# Patient Record
Sex: MALE | Race: WHITE | Employment: UNEMPLOYED | URBAN - METROPOLITAN AREA
[De-identification: names, ages, dates, MRNs, and addresses within clinical notes are randomized per-mention and may not be internally consistent; named-entity substitution may affect disease eponyms.]

---
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
## 2020-05-04 RX ORDER — ACETAZOLAMIDE 500 MG/1
CAPSULE, EXTENDED RELEASE ORAL
Qty: 60 CAP | Refills: 1 | Status: SHIPPED | OUTPATIENT
Start: 2020-05-04 | End: 2020-08-04

## 2020-07-24 ENCOUNTER — TELEPHONE (OUTPATIENT)
Dept: NEUROLOGY | Age: 20
End: 2020-07-24

## 2020-08-04 RX ORDER — ACETAZOLAMIDE 500 MG/1
CAPSULE, EXTENDED RELEASE ORAL
Qty: 60 CAP | Refills: 1 | Status: SHIPPED | OUTPATIENT
Start: 2020-08-04 | End: 2020-09-26

## 2020-08-11 ENCOUNTER — OFFICE VISIT (OUTPATIENT)
Dept: NEUROLOGY | Age: 20
End: 2020-08-11
Payer: COMMERCIAL

## 2020-08-11 VITALS
TEMPERATURE: 97.7 F | HEIGHT: 71 IN | BODY MASS INDEX: 44.1 KG/M2 | DIASTOLIC BLOOD PRESSURE: 74 MMHG | HEART RATE: 120 BPM | WEIGHT: 315 LBS | SYSTOLIC BLOOD PRESSURE: 122 MMHG | OXYGEN SATURATION: 98 % | RESPIRATION RATE: 18 BRPM

## 2020-08-11 DIAGNOSIS — G93.2 IIH (IDIOPATHIC INTRACRANIAL HYPERTENSION): Primary | ICD-10-CM

## 2020-08-11 PROCEDURE — 99213 OFFICE O/P EST LOW 20 MIN: CPT | Performed by: SPECIALIST

## 2020-08-11 NOTE — PROGRESS NOTES
Neurology Consult      Subjective:      Remy Coffey is a 21 y.o. male who comes in today and very proudly announces he is lost 15 pounds. Congratulated him on that achievement and still on his Diamox sustained release 500 mg twice daily. No tolerability issues. Currently asymptomatic as a goes to no headaches no tinnitus no TVO no breakout diplopia no spontaneous changes of visual perspective. As I recall his opening pressure on the LP was only 29 cmH2O and I am very optimistic he can make even make additional self-help measures when it comes to prudent weight reduction. Says he is sure he has an appointment with [facility] perhaps even in October but will check and let us know so I can do a follow-up visit in that timeframe. Continued success and good luck. Current Outpatient Medications   Medication Sig Dispense Refill    acetaZOLAMIDE SR (DIAMOX) 500 mg capsule TAKE 1 CAPSULE BY MOUTH TWICE DAILY 60 Cap 1    ibuprofen (MOTRIN) 600 mg tablet Take  by mouth every six (6) hours as needed for Pain.  Cetirizine (ZYRTEC) 10 mg cap Take  by mouth.  fluticasone propionate (FLONASE NA) by Nasal route. No Known Allergies  No past medical history on file.    Past Surgical History:   Procedure Laterality Date    HX TONSIL AND ADENOIDECTOMY      HX TYMPANOSTOMY        Social History     Socioeconomic History    Marital status: SINGLE     Spouse name: Not on file    Number of children: Not on file    Years of education: Not on file    Highest education level: Not on file   Occupational History    Not on file   Social Needs    Financial resource strain: Not on file    Food insecurity     Worry: Not on file     Inability: Not on file    Transportation needs     Medical: Not on file     Non-medical: Not on file   Tobacco Use    Smoking status: Never Smoker    Smokeless tobacco: Never Used   Substance and Sexual Activity    Alcohol use: Never     Frequency: Never    Drug use: Not on file    Sexual activity: Not on file   Lifestyle    Physical activity     Days per week: Not on file     Minutes per session: Not on file    Stress: Not on file   Relationships    Social connections     Talks on phone: Not on file     Gets together: Not on file     Attends Congregational service: Not on file     Active member of club or organization: Not on file     Attends meetings of clubs or organizations: Not on file     Relationship status: Not on file    Intimate partner violence     Fear of current or ex partner: Not on file     Emotionally abused: Not on file     Physically abused: Not on file     Forced sexual activity: Not on file   Other Topics Concern    Not on file   Social History Narrative    Not on file      Family History   Problem Relation Age of Onset    Seizures Mother     Heart Disease Maternal Grandmother     Dementia Maternal Grandfather     Heart Disease Maternal Grandfather     Neuropathy Maternal Grandfather     Heart Disease Paternal Grandmother     Heart Disease Paternal Grandfather     Neuropathy Paternal Grandfather       Visit Vitals  /74   Pulse (!) 120   Temp 97.7 °F (36.5 °C) (Temporal)   Resp 18   Ht 5' 11\" (1.803 m)   Wt 149.7 kg (330 lb 1.6 oz)   SpO2 98%   BMI 46.04 kg/m²        Review of Systems:   A comprehensive review of systems was negative except for that written in the HPI. Neuro Exam:     Appearance: The patient is well developed, well nourished, provides a coherent history and is in no acute distress. Mental Status: Oriented to time, place and person. Mood and affect appropriate. Cranial Nerves:   Intact visual fields. Fundi are remarkable for only minor circumpapillary swelling had no hemorrhages exudate or spasm no compromise of vessels over disc edge. RIGOBERTO, EOM's full, no nystagmus, no ptosis. Facial sensation is normal. Corneal reflexes are intact. Facial movement is symmetric. Hearing is normal bilaterally.  Palate is midline with normal sternocleidomastoid and trapezius muscles are normal. Tongue is midline. Visual acuity near with glasses is 20/20 OU without hesitation APD negative. Motor:  5/5 strength in upper and lower proximal and distal muscles. Normal bulk and tone. No fasciculations. Reflexes:   Deep tendon reflexes 2+/4 and symmetrical.   Sensory:   Normal to touch, pinprick and vibration. Gait:  Normal gait. Tremor:   No tremor noted. Cerebellar:  No cerebellar signs present. Neurovascular:  Normal heart sounds and regular rhythm, peripheral pulses intact, and no carotid bruits. Assessment:   Idiopathic intracranial hypertension. Very proud of his 15 pound weight loss and on today's visit he is asymptomatic. Says he has a revisit with Dr. [facility] in [facility] here fairly soon and he will let us know when that is so that we can see him right after that encounter. Does not reference any difficulty with the Diamox slow release and cites no new problems. Plan:   Revisit pended based on his encounter with [facility].   Signed by :  Felipa Deshpande MD

## 2020-11-05 ENCOUNTER — OFFICE VISIT (OUTPATIENT)
Dept: NEUROLOGY | Age: 20
End: 2020-11-05
Payer: COMMERCIAL

## 2020-11-05 VITALS
OXYGEN SATURATION: 98 % | TEMPERATURE: 98.4 F | HEIGHT: 71 IN | BODY MASS INDEX: 44.1 KG/M2 | SYSTOLIC BLOOD PRESSURE: 128 MMHG | DIASTOLIC BLOOD PRESSURE: 72 MMHG | HEART RATE: 118 BPM | RESPIRATION RATE: 18 BRPM | WEIGHT: 315 LBS

## 2020-11-05 DIAGNOSIS — G93.2 IIH (IDIOPATHIC INTRACRANIAL HYPERTENSION): Primary | ICD-10-CM

## 2020-11-05 PROCEDURE — 99213 OFFICE O/P EST LOW 20 MIN: CPT | Performed by: SPECIALIST

## 2020-11-05 RX ORDER — ACETAZOLAMIDE 125 MG/1
TABLET ORAL
Qty: 60 TAB | Refills: 5 | Status: SHIPPED | OUTPATIENT
Start: 2020-11-05 | End: 2021-05-11

## 2020-11-05 RX ORDER — ACETAZOLAMIDE 500 MG/1
CAPSULE, EXTENDED RELEASE ORAL
Qty: 150 CAP | Refills: 5 | Status: SHIPPED | OUTPATIENT
Start: 2020-11-05 | End: 2021-07-08 | Stop reason: DRUGHIGH

## 2020-11-05 NOTE — PATIENT INSTRUCTIONS
Patient history reviewed patient examined. Patient doing very well and basically no symptoms at this time. Continues to follow-up with Dr. Hallie Becker his ophthalmologist and with her approval we will drop the Diamox sustained release by 25% dosing. Suggest revisit in about 4 months. No complaints at this time.

## 2020-11-05 NOTE — PROGRESS NOTES
Neurology Consult      Subjective:      Jose Daniel Monroe is a 21 y.o. male who comes in today with idiopathic intercranial hypertension. Has seen Dr. Praveen Tejada recently and did approve of a reduction in the Diamox from 500 twice a day to a 500-250 dosing stepdown. As far as he is concerned he has no visual symptomatology as a goes to transient visual obscurations diplopia amaurosis tinnitus or similar difficulties. He seems to be very happy at this time and I think we can shoot for a revisit in about 4 months. He is not sure when he sees Dr. Praveen Tejada again and he does not reference any new medical or surgical history either. Current Outpatient Medications   Medication Sig Dispense Refill    acetaZOLAMIDE SR (DIAMOX) 500 mg capsule 3 po qd 150 Cap 5    acetaZOLAMIDE (DIAMOX) 125 mg tablet Take 2 po qd  Indications: pseudotumor cerebri, a condition with high fluid pressure in the brain 60 Tab 5    ibuprofen (MOTRIN) 600 mg tablet Take  by mouth every six (6) hours as needed for Pain.  Cetirizine (ZYRTEC) 10 mg cap Take  by mouth.  fluticasone propionate (FLONASE NA) by Nasal route. No Known Allergies  No past medical history on file.    Past Surgical History:   Procedure Laterality Date    HX TONSIL AND ADENOIDECTOMY      HX TYMPANOSTOMY        Social History     Socioeconomic History    Marital status: SINGLE     Spouse name: Not on file    Number of children: Not on file    Years of education: Not on file    Highest education level: Not on file   Occupational History    Not on file   Social Needs    Financial resource strain: Not on file    Food insecurity     Worry: Not on file     Inability: Not on file    Transportation needs     Medical: Not on file     Non-medical: Not on file   Tobacco Use    Smoking status: Never Smoker    Smokeless tobacco: Never Used   Substance and Sexual Activity    Alcohol use: Never     Frequency: Never    Drug use: Not on file    Sexual activity: Not on file   Lifestyle    Physical activity     Days per week: Not on file     Minutes per session: Not on file    Stress: Not on file   Relationships    Social connections     Talks on phone: Not on file     Gets together: Not on file     Attends Zoroastrian service: Not on file     Active member of club or organization: Not on file     Attends meetings of clubs or organizations: Not on file     Relationship status: Not on file    Intimate partner violence     Fear of current or ex partner: Not on file     Emotionally abused: Not on file     Physically abused: Not on file     Forced sexual activity: Not on file   Other Topics Concern    Not on file   Social History Narrative    Not on file      Family History   Problem Relation Age of Onset    Seizures Mother     Heart Disease Maternal Grandmother     Dementia Maternal Grandfather     Heart Disease Maternal Grandfather     Neuropathy Maternal Grandfather     Heart Disease Paternal Grandmother     Heart Disease Paternal Grandfather     Neuropathy Paternal Grandfather       Visit Vitals  /72   Pulse (!) 118   Temp 98.4 °F (36.9 °C)   Resp 18   Ht 5' 11\" (1.803 m)   Wt 150.5 kg (331 lb 11.2 oz)   SpO2 98%   BMI 46.26 kg/m²        Review of Systems:   A comprehensive review of systems was negative except for that written in the HPI. Neuro Exam:     Appearance: The patient is well developed, well nourished, provides a coherent history and is in no acute distress. Mental Status: Oriented to time, place and person. Mood and affect appropriate. Cranial Nerves:   Intact visual fields. Fundi are remarkable for very minor circumpapillary swelling but no obscuration of disc vessels and peripapillary retina appears to be settled. RIGOBERTO, EOM's full, no nystagmus, no ptosis. Facial sensation is normal. Corneal reflexes are intact. Facial movement is symmetric. Hearing is normal bilaterally.  Palate is midline with normal sternocleidomastoid and trapezius muscles are normal. Tongue is midline. Visual acuity near with correction 20/20 OU. APD negative   Motor:  5/5 strength in upper and lower proximal and distal muscles. Normal bulk and tone. No fasciculations. Reflexes:   Deep tendon reflexes 2+/4 and symmetrical.   Sensory:   Normal to touch, pinprick and vibration. Gait:  Normal gait. Tremor:   No tremor noted. Cerebellar:  No cerebellar signs present. Neurovascular:  Normal heart sounds and regular rhythm, peripheral pulses intact, and no carotid bruits. Assessment:   Idiopathic intracranial hypertension. Doing quite well and will suggest a revisit in about 4 months. With Dr. Gege Velasquez approval we will drop his dosing of the Diamox to 500 mg and 250 mg daily. References no adverse events with the Diamox. Seems to be very satisfied and literally has no visual symptoms at this time. Revisit 4 months. Plan:   Revisit 4 months.   Signed by :  Nimco Braden MD

## 2021-03-05 ENCOUNTER — OFFICE VISIT (OUTPATIENT)
Dept: NEUROLOGY | Age: 21
End: 2021-03-05
Payer: COMMERCIAL

## 2021-03-05 VITALS
SYSTOLIC BLOOD PRESSURE: 130 MMHG | HEIGHT: 71 IN | HEART RATE: 122 BPM | WEIGHT: 315 LBS | RESPIRATION RATE: 17 BRPM | BODY MASS INDEX: 44.1 KG/M2 | OXYGEN SATURATION: 100 % | DIASTOLIC BLOOD PRESSURE: 72 MMHG

## 2021-03-05 DIAGNOSIS — G93.2 IIH (IDIOPATHIC INTRACRANIAL HYPERTENSION): Primary | ICD-10-CM

## 2021-03-05 PROCEDURE — 99214 OFFICE O/P EST MOD 30 MIN: CPT | Performed by: SPECIALIST

## 2021-03-05 NOTE — PATIENT INSTRUCTIONS
Patient history reviewed patient examined. Very excited about patient's presentation today and seems to have minimal if not altogether no side effects from the medicine and a good return of function. He hopefully will will be seeing Dr. Prudence Dougherty in the not too distant future and that would suggest a revisit here in about 4 months. He offers no new situations or concerns and keep up the good work.

## 2021-03-05 NOTE — PROGRESS NOTES
Neurology Consult      Subjective:      Donald Holly is a 21 y.o. male who comes in today with idiopathic intracranial hypertension. Is on the Diamox sustained release and has no downside with taking the product. The only inconvenient aspect of the drug is is a diuretic he does have his moments where he needs to find the restroom. Otherwise on the negative list of symptoms he has no tinnitus no breakthrough diplopia no TVO no headache and his vision remains unqualifyingly normal.  Says he is consciously watching his diet with discretion. If I understand him I think he is close to a revisit with Dr. Rusty Zaragoza but he will check. I will see him in 4 months. Current Outpatient Medications   Medication Sig Dispense Refill    acetaZOLAMIDE (DIAMOX) 125 mg tablet Take 2 po qd  Indications: pseudotumor cerebri, a condition with high fluid pressure in the brain 60 Tab 5    ibuprofen (MOTRIN) 600 mg tablet Take  by mouth every six (6) hours as needed for Pain.  Cetirizine (ZYRTEC) 10 mg cap Take  by mouth.  fluticasone propionate (FLONASE NA) by Nasal route.  acetaZOLAMIDE SR (DIAMOX) 500 mg capsule 3 po qd 150 Cap 5      No Known Allergies  No past medical history on file.    Past Surgical History:   Procedure Laterality Date    HX TONSIL AND ADENOIDECTOMY      HX TYMPANOSTOMY        Social History     Socioeconomic History    Marital status: SINGLE     Spouse name: Not on file    Number of children: Not on file    Years of education: Not on file    Highest education level: Not on file   Occupational History    Not on file   Social Needs    Financial resource strain: Not on file    Food insecurity     Worry: Not on file     Inability: Not on file    Transportation needs     Medical: Not on file     Non-medical: Not on file   Tobacco Use    Smoking status: Never Smoker    Smokeless tobacco: Never Used   Substance and Sexual Activity    Alcohol use: Never     Frequency: Never    Drug use: Not on file    Sexual activity: Not on file   Lifestyle    Physical activity     Days per week: Not on file     Minutes per session: Not on file    Stress: Not on file   Relationships    Social connections     Talks on phone: Not on file     Gets together: Not on file     Attends Jainism service: Not on file     Active member of club or organization: Not on file     Attends meetings of clubs or organizations: Not on file     Relationship status: Not on file    Intimate partner violence     Fear of current or ex partner: Not on file     Emotionally abused: Not on file     Physically abused: Not on file     Forced sexual activity: Not on file   Other Topics Concern    Not on file   Social History Narrative    Not on file      Family History   Problem Relation Age of Onset    Seizures Mother     Heart Disease Maternal Grandmother     Dementia Maternal Grandfather     Heart Disease Maternal Grandfather     Neuropathy Maternal Grandfather     Heart Disease Paternal Grandmother     Heart Disease Paternal Grandfather     Neuropathy Paternal Grandfather       Visit Vitals  /72   Pulse (!) 122   Resp 17   Ht 5' 11\" (1.803 m)   Wt 152.9 kg (337 lb)   SpO2 100%   BMI 47.00 kg/m²        Review of Systems:   A comprehensive review of systems was negative except for that written in the HPI. Neuro Exam:     Appearance: The patient is well developed, well nourished, provides a coherent history and is in no acute distress. Mental Status: Oriented to time, place and person. Mood and affect appropriate. Cranial Nerves:   Intact visual fields to static can confrontation. Fundi are remarkable only for subtle circumpapillary swelling and I look for SVP but did not quite see it. RIGOBERTO, EOM's full, no nystagmus, no ptosis. Facial sensation is normal. Corneal reflexes are intact. Facial movement is symmetric. Hearing is normal bilaterally.  Palate is midline with normal sternocleidomastoid and trapezius muscles are normal. Tongue is midline. Visual acuity not hesitating 20/20 OU with glasses APD negative   Motor:  5/5 strength in upper and lower proximal and distal muscles. Normal bulk and tone. No fasciculations. Reflexes:   Deep tendon reflexes 2+/4 and symmetrical.   Sensory:   Normal to touch, pinprick and vibration. Gait:  Normal gait. Tremor:   No tremor noted. Cerebellar:  No cerebellar signs present. Neurovascular:  Normal heart sounds and regular rhythm, peripheral pulses intact, and no carotid bruits. Assessment:   Idiopathic intracranial hypertension. Patient doing fantastic and I will not touch anything. He should be seeing Dr. Dave Gaspar in the not too distant future. And I will see him back in 4 months. He looks good he has no problems with the medicine etc.      Plan:   Revisit 4 months.   Signed by :  Keerthi Stuart MD

## 2021-05-11 RX ORDER — ACETAZOLAMIDE 125 MG/1
TABLET ORAL
Qty: 60 TAB | Refills: 5 | Status: SHIPPED | OUTPATIENT
Start: 2021-05-11 | End: 2021-07-08 | Stop reason: DRUGHIGH

## 2021-07-08 ENCOUNTER — OFFICE VISIT (OUTPATIENT)
Dept: NEUROLOGY | Age: 21
End: 2021-07-08
Payer: COMMERCIAL

## 2021-07-08 VITALS
SYSTOLIC BLOOD PRESSURE: 130 MMHG | HEIGHT: 71 IN | OXYGEN SATURATION: 100 % | HEART RATE: 101 BPM | BODY MASS INDEX: 47 KG/M2 | DIASTOLIC BLOOD PRESSURE: 94 MMHG

## 2021-07-08 DIAGNOSIS — G93.2 IIH (IDIOPATHIC INTRACRANIAL HYPERTENSION): Primary | ICD-10-CM

## 2021-07-08 PROCEDURE — 99213 OFFICE O/P EST LOW 20 MIN: CPT | Performed by: SPECIALIST

## 2021-07-08 RX ORDER — ACETAZOLAMIDE 500 MG/1
CAPSULE, EXTENDED RELEASE ORAL
Qty: 30 CAPSULE | Refills: 5 | Status: SHIPPED | OUTPATIENT
Start: 2021-07-08

## 2021-07-08 NOTE — PROGRESS NOTES
Identified pt with two pt identifiers(name and ). Reviewed record in preparation for visit and have obtained necessary documentation. Chief Complaint   Patient presents with    Follow-up        Vitals:    21 1540   BP: (!) 130/94   Pulse: (!) 101   SpO2: 100%   Height: 5' 11\" (1.803 m)   PainSc:   0 - No pain       Health Maintenance Due   Topic    Hepatitis C Screening     HPV Age 9Y-34Y (1 - Male 2-dose series)    COVID-19 Vaccine (1)    DTaP/Tdap/Td series (1 - Tdap)       Coordination of Care Questionnaire:  :   1) Have you been to an emergency room, urgent care, or hospitalized since your last visit? If yes, where when, and reason for visit? No    2. Have seen or consulted any other health care provider since your last visit? If yes, where when, and reason for visit?   No

## 2021-07-08 NOTE — PATIENT INSTRUCTIONS
Patient history reviewed patient examined. Is doing well at this point and really has no symptoms except some episodes of nausea and motion mismatch if he is in a car but not following the visual landscape. My suggestion on the nausea would be to try rosa which has some affiliations with treating dizziness and related issues and it might be helpful. Will be seeing him back in November. Apparently sees Dr. Corona Garcia in October.

## 2021-07-08 NOTE — PROGRESS NOTES
Neurology Consult      Subjective:      Chico Nunn is a 24 y.o. male who comes in today as a regular follow-up for II. He is asymptomatic as I understand it with no endorsement of an neurosis TVO tinnitus breakout double vision etc.  He gets a little nausea from the Diamox and has noticed if he is in a moving vehicle and not driving and not keeping up with the moving landscape he gets an unsettling feeling of nausea and/or dizziness as well. Again as I explained to him I think this is a simple mismatch between his perceived motion and visual mapping. I have seen some people use ginger as a natural aid to help deal with dizziness lightheadedness and related issues. It could be that the same suggestion works for his issue as well. Says he is currently on 500 mg of the Diamox sustained release per day and doing well. Current Outpatient Medications   Medication Sig Dispense Refill    acetaZOLAMIDE (DIAMOX) 125 mg tablet TAKE 2 TABLETS BY MOUTH EVERY DAY 60 Tab 5    acetaZOLAMIDE SR (DIAMOX) 500 mg capsule 3 po qd 150 Cap 5    ibuprofen (MOTRIN) 600 mg tablet Take  by mouth every six (6) hours as needed for Pain.  fluticasone propionate (FLONASE NA) by Nasal route.  Cetirizine (ZYRTEC) 10 mg cap Take  by mouth. (Patient not taking: Reported on 7/8/2021)        No Known Allergies  No past medical history on file.    Past Surgical History:   Procedure Laterality Date    HX TONSIL AND ADENOIDECTOMY      HX TYMPANOSTOMY        Social History     Socioeconomic History    Marital status: SINGLE     Spouse name: Not on file    Number of children: Not on file    Years of education: Not on file    Highest education level: Not on file   Occupational History    Not on file   Tobacco Use    Smoking status: Never Smoker    Smokeless tobacco: Never Used   Vaping Use    Vaping Use: Never used   Substance and Sexual Activity    Alcohol use: Never    Drug use: Not on file    Sexual activity: Not on file   Other Topics Concern    Not on file   Social History Narrative    Not on file     Social Determinants of Health     Financial Resource Strain:     Difficulty of Paying Living Expenses:    Food Insecurity:     Worried About Running Out of Food in the Last Year:     [address] in the Last Year:    Transportation Needs:     Lack of Transportation (Medical):  Lack of Transportation (Non-Medical):    Physical Activity:     Days of Exercise per Week:     Minutes of Exercise per Session:    Stress:     Feeling of Stress :    Social Connections:     Frequency of Communication with Friends and Family:     Frequency of Social Gatherings with Friends and Family:     Attends Anabaptism Services:     Active Member of Clubs or Organizations:     Attends Club or Organization Meetings:     Marital Status:    Intimate Partner Violence:     Fear of Current or Ex-Partner:     Emotionally Abused:     Physically Abused:     Sexually Abused:       Family History   Problem Relation Age of Onset    Seizures Mother     Heart Disease Maternal Grandmother     Dementia Maternal Grandfather     Heart Disease Maternal Grandfather     Neuropathy Maternal Grandfather     Heart Disease Paternal Grandmother     Heart Disease Paternal Grandfather     Neuropathy Paternal Grandfather       Visit Vitals  BP (!) 130/94 (BP 1 Location: Left upper arm, BP Patient Position: Sitting, BP Cuff Size: Large adult)   Pulse (!) 101   Ht 5' 11\" (1.803 m)   SpO2 100%   BMI 47.00 kg/m²        Review of Systems:   A comprehensive review of systems was negative except for that written in the HPI. Neuro Exam:     Appearance: The patient is well developed, well nourished, provides a coherent history and is in no acute distress. Mental Status: Oriented to time, place and person. Mood and affect appropriate. Cranial Nerves:   Intact visual fields to static hand confrontation.  Fundi are remarkable for only subtle circumpapillary swelling but again no obscuration of vessels no retinal folds no hemorrhages exudate or spasm and I do not see SVP. RIGOBERTO, EOM's full, no nystagmus, no ptosis. Facial sensation is normal. Corneal reflexes are intact. Facial movement is symmetric. Hearing is normal bilaterally. Palate is midline with normal sternocleidomastoid and trapezius muscles are normal. Tongue is midline. Visual acuity near with correction 20/20 OU. APD negative. Motor:  5/5 strength in upper and lower proximal and distal muscles. Normal bulk and tone. No fasciculations. Reflexes:   Deep tendon reflexes 2+/4 and symmetrical.   Sensory:   Normal to touch, pinprick and vibration. Gait:  Normal gait. Tremor:   No tremor noted. Cerebellar:  No cerebellar signs present. Neurovascular:  Normal heart sounds and regular rhythm, peripheral pulses intact, and no carotid bruits. Assessment:   IIH. Is doing well and I will not change anything. At this point is on Diamox 500 mg and otherwise tolerates it but has occasions of nausea. Also has astutely noted that when he is in a moving vehicle if he is not doing the driving or keeping up with the visual landscape he gets a little unsettled. I am sure it is the motion sensation and the visual mismatch going on. With regard to the nausea I would suggest maybe trying rosa as it does have some application naturally and help some people deal with dizziness and related issues. Is due to see Dr. Por Diaz in October and I will see him in November. Plan:   Revisit November.   Signed by :  Hugh Jewell MD

## 2021-11-09 ENCOUNTER — OFFICE VISIT (OUTPATIENT)
Dept: NEUROLOGY | Age: 21
End: 2021-11-09
Payer: COMMERCIAL

## 2021-11-09 VITALS
DIASTOLIC BLOOD PRESSURE: 84 MMHG | SYSTOLIC BLOOD PRESSURE: 148 MMHG | RESPIRATION RATE: 16 BRPM | HEIGHT: 71 IN | TEMPERATURE: 98.4 F | BODY MASS INDEX: 44.1 KG/M2 | HEART RATE: 127 BPM | WEIGHT: 315 LBS | OXYGEN SATURATION: 98 %

## 2021-11-09 DIAGNOSIS — G93.2 IIH (IDIOPATHIC INTRACRANIAL HYPERTENSION): Primary | ICD-10-CM

## 2021-11-09 PROCEDURE — 99213 OFFICE O/P EST LOW 20 MIN: CPT | Performed by: SPECIALIST

## 2021-11-09 NOTE — PATIENT INSTRUCTIONS
Patient has successfully discontinued his Diamox and is doing well. Saw Dr. Jasen Guadalupe several weeks ago and I need to get her notes but apparently everything checked out fine. Did have some side effects from the Diamox and does not reference any new symptoms since his last visit here.

## 2021-11-09 NOTE — PROGRESS NOTES
Neurology Consult      Subjective:      Nathaniel Hernadez is a 24 y.o. male who comes in today on follow-up with previously referenced II. Had been on Diamox slow release 500 mg a day. Was slightly symptomatic with the product and would notice some nausea. In the meantime since the discontinuation of the product in August he has had no headaches no TVO no diplopia no tinnitus no visual obscurations etc. saw Dr. Jasen Guadalupe several weeks ago and my understanding is he checked out okay. Has an appointment with that office on April 27 of next year and the hope is good luck can continue. I thought his exam today looked good and if I do not hear from him I will assume that he continues to do well. Best of luck and was a real privilege working with this young gentleman. Current Outpatient Medications   Medication Sig Dispense Refill    ibuprofen (MOTRIN) 600 mg tablet Take  by mouth every six (6) hours as needed for Pain.  Cetirizine (ZYRTEC) 10 mg cap Take  by mouth.  fluticasone propionate (FLONASE NA) by Nasal route.  acetaZOLAMIDE SR (DIAMOX) 500 mg capsule 1 po qd  Indications: pseudotumor cerebri, a condition with high fluid pressure in the brain (Patient not taking: Reported on 11/9/2021) 30 Capsule 5      No Known Allergies  No past medical history on file.    Past Surgical History:   Procedure Laterality Date    HX TONSIL AND ADENOIDECTOMY      HX TYMPANOSTOMY        Social History     Socioeconomic History    Marital status: SINGLE     Spouse name: Not on file    Number of children: Not on file    Years of education: Not on file    Highest education level: Not on file   Occupational History    Not on file   Tobacco Use    Smoking status: Never Smoker    Smokeless tobacco: Never Used   Vaping Use    Vaping Use: Never used   Substance and Sexual Activity    Alcohol use: Never    Drug use: Not on file    Sexual activity: Not on file   Other Topics Concern    Not on file   Social History Narrative    Not on file     Social Determinants of Health     Financial Resource Strain:     Difficulty of Paying Living Expenses: Not on file   Food Insecurity:     Worried About Running Out of Food in the Last Year: Not on file    [place] of Food in the Last Year: Not on file   Transportation Needs:     Lack of Transportation (Medical): Not on file    Lack of Transportation (Non-Medical):  Not on file   Physical Activity:     Days of Exercise per Week: Not on file    Minutes of Exercise per Session: Not on file   Stress:     Feeling of Stress : Not on file   Social Connections:     Frequency of Communication with Friends and Family: Not on file    Frequency of Social Gatherings with Friends and Family: Not on file    Attends Restoration Services: Not on file    Active Member of [address] [place] [address] [employer] or Organizations: Not on file    Attends Club or Organization Meetings: Not on file    Marital Status: Not on file   Intimate Partner Violence:     Fear of Current or Ex-Partner: Not on file    Emotionally Abused: Not on file    Physically Abused: Not on file    Sexually Abused: Not on file   Housing Stability:     Unable to Pay for Housing in the Last Year: Not on file    Number of [place] in the Last Year: Not on file    Unstable Housing in the Last Year: Not on file      Family History   Problem Relation Age of Onset    Seizures Mother     Heart Disease Maternal Grandmother     Dementia Maternal Grandfather     Heart Disease Maternal Grandfather     Neuropathy Maternal Grandfather     Heart Disease Paternal Grandmother     Heart Disease Paternal Grandfather     Neuropathy Paternal Grandfather       Visit Vitals  BP (!) 148/84 (BP 1 Location: Left upper arm, BP Patient Position: Sitting)   Pulse (!) 127   Temp 98.4 °F (36.9 °C) (Temporal)   Resp 16   Ht 5' 11\" (1.803 m)   Wt (!) 163.7 kg (361 lb)   SpO2 98%   BMI 50.35 kg/m²        Review of Systems:   A comprehensive review of systems was negative except for that written in the HPI. Neuro Exam:     Appearance: The patient is well developed, well nourished, provides a coherent history and is in no acute distress. Mental Status: Oriented to time, place and person. Mood and affect appropriate. Cranial Nerves:   Intact visual fields. Fundi are benign. . RIGOBERTO, EOM's full, no nystagmus, no ptosis. Facial sensation is normal. Corneal reflexes are intact. Facial movement is symmetric. Hearing is normal bilaterally. Palate is midline with normal sternocleidomastoid and trapezius muscles are normal. Tongue is midline. Visual acuity near without hesitation 20/20 OU with correction APD negative. Motor:  5/5 strength in upper and lower proximal and distal muscles. Normal bulk and tone. No fasciculations. Reflexes:   Deep tendon reflexes 2+/4 and symmetrical.   Sensory:   Normal to touch, pinprick and vibration. Gait:  Normal gait. Tremor:   No tremor noted. Cerebellar:  No cerebellar signs present. Neurovascular:  Normal heart sounds and regular rhythm, peripheral pulses intact, and no carotid bruits. Assessment:   History of IIH. Doing well considers himself asymptomatic including visual issues and no headaches. Will follow up with Dr. Delano Ryan on April 27 the next year and hopefully his success can continue. Was symptomatic with the Diamox so it is a win-win situation all around. I am available if there are new and/or recurrent issues in the future. Appreciate Dr. Lucas Dys assistance in the matter. Plan:   Revisit as needed.   Signed by :  Pepito Arana MD

## 2022-02-02 NOTE — PERIOP NOTES
Mother's name stated on voicemail; Left generalized message regarding COVID requirements, a COVID test needs to be completed in order to proceed with procedures at [facility]. Left message regarding curbside COVID swabbing at [facility] Friday, February 4th from [id number]. Call [facility] Endoscopy at [phone] Monday thru Friday with questions or concerns.

## 2022-02-03 NOTE — PERIOP NOTES
Informed patient and patient's mother of COVID requirements, patient to complete COVID curbside testing at [address] [place] [address] tomorrow, Friday, February 4th between [id number]. Patient and patient's mother verbalized understanding that COVID test is required to proceed with procedure.

## 2022-02-04 ENCOUNTER — TRANSCRIBE ORDER (OUTPATIENT)
Dept: REGISTRATION | Age: 22
End: 2022-02-04

## 2022-02-04 ENCOUNTER — HOSPITAL ENCOUNTER (OUTPATIENT)
Dept: LAB | Age: 22
Discharge: HOME | End: 2022-02-04
Payer: COMMERCIAL

## 2022-02-04 DIAGNOSIS — Z01.812 PRE-PROCEDURAL LABORATORY EXAMINATIONS: ICD-10-CM

## 2022-02-04 DIAGNOSIS — Z01.812 PRE-PROCEDURAL LABORATORY EXAMINATIONS: Primary | ICD-10-CM

## 2022-02-04 PROCEDURE — U0005 INFEC AGEN DETEC AMPLI PROBE: HCPCS

## 2022-02-05 LAB
SARS-COV-2, XPLCVT: NOT DETECTED
SOURCE, COVRS: NORMAL

## 2022-02-07 NOTE — H&P
Date: 2022 2:45 PM  Patient Name: Gavi Guaman  Account #: [id number]  Gender: Male   (age): 2000 (21)  Provider:    Nathan Ledezma. Fe Faye MD     Referring Physician:    Marina Zhou. [address] [place] [address]  [phone] (phone)  [phone] (fax)     Chief Complaint:    Iron deficiency and anemia, epigastric discomfort. History of Present Illness:  24 M with complaints of epigastric pain, intermittently, without relation to meals or activity. He had some problems with nocturnal heartburn but has been taking PPI for 6 weeks and notes that symptom has resolved. I've reviewed notes from Dr. Flor Sky as well as CBC revealing microcytic anemia hgb 12 and iron deficiency, normal liver enzymes/bilirubin. I've recommended bidirectional endoscopy and he's agreed to proceed. Past Medical History  Medical Conditions:   Anemia  Asthma  High blood pressure  Dx Studies:   CT Scan  Medications:   Centrum Men 8 mg iron- 200 mcg-600 mcg Take 1 tablet by mouth once a day as directed  Fish Oil 300-1,000 mg Take 1 capsule by mouth once a day as directed  iron,carbonyl-vitamin C 100-250 mg Take 1 tablet by mouth twice a day as directed  omeprazole 20 mg Take 1 capsule by mouth once a day as directed  Allergies:   Patient has no known allergies or drug allergies  Immunizations:   Influenza, seasonal, injectable, 10/01/2021  COVID Vaccine,   Hep B  Pneumococcal conjugate PCV 13  Social History  Alcohol:   None  Tobacco:   Never smoker  Drugs:   None  Exercise:   None  Caffeine:   None  Family History   No history of Esophogeal Cancer  Father: Diagnosed with Family history of colon polyps;  Grandmother: Diagnosed with Crohn's disease or ulcerative colitis, Liver disease;  Grandfather: Diagnosed with Family history of colon cancer;  Review of Systems:  Cardiovascular: Presents suffers from chest pain, palpitations.  Denies irregular heart beat, peripheral edema, syncope, Sweats. Constitutional: Presents suffers from fatigue. Denies fever, loss of appetite, weight gain, weight loss. ENMT: Denies nose bleeds, sore throat, hearing loss. Endocrine: Denies excessive thirst, heat intolerance. Eyes: Denies loss of vision. Gastrointestinal: Presents suffers from abdominal pain, constipation, diarrhea, nausea, stomach cramps. Denies abdominal swelling, change in bowel habits, Bloating/gas, heartburn, jaundice, rectal bleeding, vomiting, dysphagia, rectal pain, Stool incontinence, hematemesis. Genitourinary: Denies dark urine, dysuria, frequent urination, hematuria, incontinence. Hematologic/Lymphatic: Denies easy bruising, prolonged bleeding. Integumentary: Denies itching, rashes, sun sensitivity. Musculoskeletal: Denies arthritis, back pain, gout, joint pain, muscle weakness, stiffness. Neurological: Denies dizziness, fainting, frequent headaches, memory loss. Psychiatric: Denies anxiety, depression, difficulty sleeping, hallucinations, nervousness, panic attacks, paranoia. Respiratory: Denies cough, dyspnea, wheezing. Vital Signs:  BP  (mmHg)  Pulse  (bpm) Rhythm Weight (lbs/oz) Height (ft/in) BMI Temp  140/81 84 Regular 367 / 5 / 11 51.18 98.1 (F)  Physical Exam:  Constitutional:  Appearance: No distress, appears comfortable, obese. Communication: Understands/receives spoken information. Skin:  Inspection: No rash, no jaundice. Head/face: Inspection: Normacephalic, atraumatic. Eyes:  Conjunctivae/lids: Normal.  Pupils/irises: Pupils equal, round and normal.  ENMT:  External: Normal.  Hearing: Normal.  Neck:  Neck: Normal appearance, trachea midline. Jugular veins: No JVD noted. Respiratory:  Effort: Normal respiratory effort, comfortable, speaks in complete sentences. Gastrointestinal/Abdomen:  Abdomen: non-distended, nontender.   Liver/Spleen: normal,normal size,Liver size and consistency normal, spleen is non-palpable. Musculoskeletal:  Gait/station: normal.  Digits/nails: Normal, no spooning of nails, clubbing, or splinter hemorrhages,no clubbing, cyanosis, petechiae or other inflammatory conditions. Psychiatric:  Judgment/insight: Normal,normal judgement, normal insight. Orientation: oriented to time, space and person. Lab Results:   No Electronic Results  Impressions:   Epigastric pain  Iron Deficiency Anemia  Plan:   Education handout on BMI Healthy Weight  Upper Endoscopy  - NOTE BMI - NOT VEG ELIGIBLE. Colonoscopy  Suprep Bowel Prep Kit 17.5-3.13-1.6 gram Take as directed  Risk & Medical Necessity:    The level of medical decision making for this visit is moderate. The number and complexity of problems addressed are moderate. The amount and/or complexity of data to be reviewed and analyzed is moderate. The risk of complications and/or morbidity or mortality of patient management is moderate. Valdo Salgado. Meliza Cruz MD    Electronically signed on 2022 3:15:54 PM by Valdo Salgado.  Meliza Cruz MD  Johnye Prom, MRN [id number],  2000 First Visit, 2022

## 2022-02-08 RX ORDER — BISMUTH SUBSALICYLATE 262 MG
1 TABLET,CHEWABLE ORAL DAILY
COMMUNITY

## 2022-02-08 RX ORDER — IRON,CARBONYL/ASCORBIC ACID 65MG-125MG
2 TABLET, DELAYED RELEASE (ENTERIC COATED) ORAL 2 TIMES DAILY
COMMUNITY

## 2022-02-08 RX ORDER — CHOLECALCIFEROL (VITAMIN D3) 50 MCG
CAPSULE ORAL DAILY
COMMUNITY

## 2022-02-08 RX ORDER — PHENOL/SODIUM PHENOLATE
20 AEROSOL, SPRAY (ML) MUCOUS MEMBRANE DAILY
COMMUNITY

## 2022-02-09 ENCOUNTER — ANESTHESIA EVENT (OUTPATIENT)
Dept: ENDOSCOPY | Age: 22
End: 2022-02-09
Payer: COMMERCIAL

## 2022-02-09 ENCOUNTER — ANESTHESIA (OUTPATIENT)
Dept: ENDOSCOPY | Age: 22
End: 2022-02-09
Payer: COMMERCIAL

## 2022-02-09 ENCOUNTER — HOSPITAL ENCOUNTER (OUTPATIENT)
Age: 22
Setting detail: OUTPATIENT SURGERY
Discharge: HOME | End: 2022-02-09
Attending: SPECIALIST | Admitting: SPECIALIST
Payer: COMMERCIAL

## 2022-02-09 VITALS
HEIGHT: 71 IN | HEART RATE: 108 BPM | DIASTOLIC BLOOD PRESSURE: 85 MMHG | TEMPERATURE: 97.4 F | RESPIRATION RATE: 24 BRPM | SYSTOLIC BLOOD PRESSURE: 169 MMHG | WEIGHT: 315 LBS | BODY MASS INDEX: 44.1 KG/M2 | OXYGEN SATURATION: 100 %

## 2022-02-09 PROCEDURE — 74011250636 HC RX REV CODE- 250/636: Performed by: NURSE ANESTHETIST, CERTIFIED REGISTERED

## 2022-02-09 PROCEDURE — 77030021593 HC FCPS BIOP ENDOSC BSC -A: Performed by: SPECIALIST

## 2022-02-09 PROCEDURE — 88305 TISSUE EXAM BY PATHOLOGIST: CPT

## 2022-02-09 PROCEDURE — 76040000019: Performed by: SPECIALIST

## 2022-02-09 PROCEDURE — 2709999900 HC NON-CHARGEABLE SUPPLY: Performed by: SPECIALIST

## 2022-02-09 PROCEDURE — 76060000031 HC ANESTHESIA FIRST 0.5 HR: Performed by: SPECIALIST

## 2022-02-09 PROCEDURE — 74011250636 HC RX REV CODE- 250/636: Performed by: SPECIALIST

## 2022-02-09 PROCEDURE — 74011000250 HC RX REV CODE- 250: Performed by: NURSE ANESTHETIST, CERTIFIED REGISTERED

## 2022-02-09 RX ORDER — FENTANYL CITRATE 50 UG/ML
25 INJECTION, SOLUTION INTRAMUSCULAR; INTRAVENOUS AS NEEDED
Status: DISCONTINUED | OUTPATIENT
Start: 2022-02-09 | End: 2022-02-09 | Stop reason: HOSPADM

## 2022-02-09 RX ORDER — PROPOFOL 10 MG/ML
INJECTION, EMULSION INTRAVENOUS
Status: DISCONTINUED | OUTPATIENT
Start: 2022-02-09 | End: 2022-02-09 | Stop reason: HOSPADM

## 2022-02-09 RX ORDER — PROPOFOL 10 MG/ML
INJECTION, EMULSION INTRAVENOUS AS NEEDED
Status: DISCONTINUED | OUTPATIENT
Start: 2022-02-09 | End: 2022-02-09 | Stop reason: HOSPADM

## 2022-02-09 RX ORDER — SODIUM CHLORIDE 9 MG/ML
50 INJECTION, SOLUTION INTRAVENOUS CONTINUOUS
Status: DISCONTINUED | OUTPATIENT
Start: 2022-02-09 | End: 2022-02-09 | Stop reason: HOSPADM

## 2022-02-09 RX ORDER — FLUMAZENIL 0.1 MG/ML
0.2 INJECTION INTRAVENOUS
Status: DISCONTINUED | OUTPATIENT
Start: 2022-02-09 | End: 2022-02-09 | Stop reason: HOSPADM

## 2022-02-09 RX ORDER — LIDOCAINE HYDROCHLORIDE 20 MG/ML
INJECTION, SOLUTION EPIDURAL; INFILTRATION; INTRACAUDAL; PERINEURAL AS NEEDED
Status: DISCONTINUED | OUTPATIENT
Start: 2022-02-09 | End: 2022-02-09 | Stop reason: HOSPADM

## 2022-02-09 RX ORDER — MIDAZOLAM HYDROCHLORIDE 1 MG/ML
.25-5 INJECTION, SOLUTION INTRAMUSCULAR; INTRAVENOUS AS NEEDED
Status: DISCONTINUED | OUTPATIENT
Start: 2022-02-09 | End: 2022-02-09 | Stop reason: HOSPADM

## 2022-02-09 RX ORDER — DEXTROMETHORPHAN/PSEUDOEPHED 2.5-7.5/.8
1.2 DROPS ORAL
Status: DISCONTINUED | OUTPATIENT
Start: 2022-02-09 | End: 2022-02-09 | Stop reason: HOSPADM

## 2022-02-09 RX ORDER — NALOXONE HYDROCHLORIDE 0.4 MG/ML
0.4 INJECTION, SOLUTION INTRAMUSCULAR; INTRAVENOUS; SUBCUTANEOUS
Status: DISCONTINUED | OUTPATIENT
Start: 2022-02-09 | End: 2022-02-09 | Stop reason: HOSPADM

## 2022-02-09 RX ADMIN — LIDOCAINE HYDROCHLORIDE 50 MG: 20 INJECTION, SOLUTION INTRAVENOUS at 10:48

## 2022-02-09 RX ADMIN — PROPOFOL 100 MG: 10 INJECTION, EMULSION INTRAVENOUS at 10:52

## 2022-02-09 RX ADMIN — PROPOFOL 140 MCG/KG/MIN: 10 INJECTION, EMULSION INTRAVENOUS at 10:48

## 2022-02-09 RX ADMIN — SODIUM CHLORIDE 50 ML/HR: 9 INJECTION, SOLUTION INTRAVENOUS at 10:27

## 2022-02-09 RX ADMIN — PROPOFOL 50 MG: 10 INJECTION, EMULSION INTRAVENOUS at 10:55

## 2022-02-09 RX ADMIN — PROPOFOL 150 MG: 10 INJECTION, EMULSION INTRAVENOUS at 10:48

## 2022-02-09 RX ADMIN — PROPOFOL 150 MG: 10 INJECTION, EMULSION INTRAVENOUS at 10:51

## 2022-02-09 NOTE — ANESTHESIA PREPROCEDURE EVALUATION
Relevant Problems   ENDOCRINE   (+) Obesity, morbid ([address]       Anesthetic History   No history of anesthetic complications            Review of Systems / Medical History  Patient summary reviewed and pertinent labs reviewed    Pulmonary  Within defined limits            Pertinent negatives: Asthma: childhood asthma. Neuro/Psych   Within defined limits           Cardiovascular  Within defined limits              Pertinent negatives: Pacemaker: elevated BP.   Exercise tolerance: >4 METS     GI/Hepatic/Renal     GERD           Endo/Other        Morbid obesity and anemia     Other Findings              Physical Exam    Airway  Mallampati: II  TM Distance: 4 - 6 cm  Neck ROM: normal range of motion   Mouth opening: Normal     Cardiovascular    Rhythm: regular  Rate: normal         Dental    Dentition: Upper dentition intact and Lower dentition intact     Pulmonary  Breath sounds clear to auscultation               Abdominal         Other Findings            Anesthetic Plan    ASA: 2  Anesthesia type: MAC          Induction: Intravenous  Anesthetic plan and risks discussed with: Patient

## 2022-02-09 NOTE — PERIOP NOTES
1045  Timeout performed. Anesthesia staff at patient's bedside administering anesthesia and monitoring patients vital signs throughout procedure. See anesthesia note. Post procedure, report received from Germaine MCLAUGHLIN. 36  Endoscope was pre-cleaned at bedside immediately following procedure by [employer] Narayan aguirre. 1106  Patient tolerated procedure. Abdomen soft and patient arousable and voices no complaints. Patient transported to endoscopy recovery area. Report given to post procedure RNAbdiel.

## 2022-02-09 NOTE — PROGRESS NOTES
Endoscopy discharge instructions have been reviewed and given to patient. The patient verbalized understanding and acceptance of instructions. Dr. Mercy Chen discussed with patient procedure findings and next steps.

## 2022-02-09 NOTE — PROCEDURES
[facility] JORGE Nelson MD  [phone]      2022    Esophagogastroduodenoscopy & Colonoscopy Procedure Note  Wilfred Ferrari  : 2000  [employer] Medical Record Number: [id number]      Indications:    Iron deficiency anemia Epigastric discomfort  Referring Physician:  Deloris Walker MD  Anesthesia/Sedation: Conscious Sedation/Moderate Sedation/MAC  Endoscopist:  Dr. Marlo Ray  Complications:  None  Estimated Blood Loss:  None    Permit:  The indications, risks, benefits and alternatives were reviewed with the patient or their decision maker who was provided an opportunity to ask questions and all questions were answered. The specific risks of esophagogastroduodenoscopy with conscious sedation were reviewed, including but not limited to anesthetic complication, bleeding, adverse drug reaction, missed lesion, infection, IV site reactions, and intestinal perforation which would lead to the need for surgical repair. Alternatives to EGD and colonoscopy including radiographic imaging, observation without testing, or laboratory testing were reviewed as well as the limitations of those alternatives discussed. After considering the options and having all their questions answered, the patient or their decision maker provided both verbal and written consent to proceed. -----------EGD------------   Procedure in Detail:  After obtaining informed consent, positioning of the patient in the left lateral decubitus position, and conduction of a pre-procedure pause or \"time out\" the endoscope was introduced into the mouth and advanced to the duodenum. A careful inspection was made, and findings or interventions are described below.     Findings:   Esophagus:normal  Stomach: normal   Duodenum/jejunum: normal, cold forceps biopsies taken from the duodenum for histology and exclusion of celiac disease.        ----------Colonoscopy-----------    Procedure in Detail:  After obtaining informed consent, positioning of the patient in the left lateral decubitus position, and conduction of a pre-procedure pause or \"time out\" the endoscope was introduced into the anus and advanced to the terminal ileum. The quality of the colonic preparation was excellent. A careful inspection was made as the colonoscope was withdrawn, findings and interventions are described below. Findings:   Completely normal terminal ileum and colonic mucosa - normal colonoscopy to terminal ileum. ------------------------------  Specimens:    See above    Complications:   None; patient tolerated the procedure well. Impressions:  EGD:  Normal.  Colonoscopy: Normal colonoscopy to the terminal ileum, with no evidence of neoplasia, diverticular disease, or mucosal abnormality. Recommendations:     - Await pathology. - If pathology normal would get pillcam study to complete the GI evaluation. Thank you for entrusting me with this patient's care. Please do not hesitate to contact me with any questions or if I can be of assistance with any of your other patients' GI needs. Signed By: Abdiel Carr MD                        February 9, 2022    Surgical assistant none. Implants none unless specified.

## 2022-02-09 NOTE — INTERVAL H&P NOTE
Pre-Endoscopy H&P Update  Chief complaint/HPI/ROS:  The indication for the procedure, the patient's history and the patient's current medications are reviewed prior to the procedure and that data is reported on the H&P to which this document is attached. Any significant complaints with regard to organ systems will be noted, and if not mentioned then a review of systems is not contributory. Past Medical History:   Diagnosis Date    Anemia, iron deficiency     GERD (gastroesophageal reflux disease)     Migraines       Past Surgical History:   Procedure Laterality Date    HX TONSIL AND ADENOIDECTOMY      HX TYMPANOSTOMY       Social   Social History     Tobacco Use    Smoking status: Never Smoker    Smokeless tobacco: Never Used   Substance Use Topics    Alcohol use: Never      Family History   Problem Relation Age of Onset    Seizures Mother     Heart Disease Maternal Grandmother     Dementia Maternal Grandfather     Heart Disease Maternal Grandfather     Neuropathy Maternal Grandfather     Heart Disease Paternal Grandmother     Heart Disease Paternal Grandfather     Neuropathy Paternal Grandfather       No Known Allergies   Prior to Admission Medications   Prescriptions Last Dose Informant Patient Reported? Taking? Cetirizine (ZYRTEC) 10 mg cap Unknown at Unknown time  Yes No   Sig: Take  by mouth as needed. Omeprazole delayed release (PRILOSEC D/R) 20 mg tablet 2022  Yes Yes   Sig: Take 20 mg by mouth daily. acetaZOLAMIDE SR (DIAMOX) 500 mg capsule Not Taking at Unknown time  No No   Si po qd  Indications: pseudotumor cerebri, a condition with high fluid pressure in the brain   Patient not taking: Reported on 2021   fluticasone propionate (FLONASE NA) Unknown at Unknown time  Yes No   Sig: by Nasal route as needed.    ibuprofen (MOTRIN) 600 mg tablet Unknown at Unknown time  Yes No   Sig: Take  by mouth every six (6) hours as needed for Pain.   iron,carbonyl-vitamin C (Vitron-C) 65 mg iron- 125 mg TbEC 2/7/2022  Yes Yes   Sig: Take 2 Capsules by mouth two (2) times a day. multivitamin (ONE A DAY) tablet 2/7/2022  Yes Yes   Sig: Take 1 Tablet by mouth daily. omega 3-dha-epa-fish oil (Fish OiL) 100-160-1,000 mg cap 2/8/2022  Yes Yes   Sig: Take  by mouth daily. Facility-Administered Medications: None       PHYSICAL EXAM:  The patient is examined immediately prior to the procedure. Visit Vitals  BP (!) 169/85   Pulse (!) 108   Resp 24   Ht 5' 11\" (1.803 m)   Wt (!) 165 kg (363 lb 12.1 oz)   SpO2 100%   BMI 50.73 kg/m²     Gen: Appears comfortable, no distress. Pulm: comfortable respirations with no abnormal audible breath sounds  HEART: well perfused, no abnormal audible heart sounds  GI: abdomen flat. PLAN:  Informed consent discussion held, patient afforded an opportunity to ask questions and all questions answered. After being advised of the risks, benefits, and alternatives, the patient requested that we proceed and indicated so on a written consent form. Will proceed with procedure as planned.   Lana Canseco MD

## 2022-02-09 NOTE — DISCHARGE INSTRUCTIONS
[facility] JORGE Dunn MD  [phone]      2022    Marcel Garces  YOB: 2000    COLONOSCOPY DISCHARGE INSTRUCTIONS    If there is redness at IV site you should apply warm compress to area. If redness or soreness persist contact Dr. Dayo Dunn' or your primary care doctor. There may be a slight amount of blood passed from the rectum. Gaseous discomfort may develop, but walking, belching will help relieve this. You may not operate a vehicle for 12 hours  You may not operate machinery or dangerous appliances for rest of today  You may not drink alcoholic beverages for 12 hours  Avoid making any critical decisions for 24 hours    DIET:  You may resume your normal diet, but some patients find that heavy or large meals may lead to indigestion or vomiting. I suggest a light meal as first food intake. MEDICATIONS:  The use of some over-the-counter pain medication may lead to bleeding after colon biopsies or polyp removal.  Tylenol (also called acetaminophen) is safe to take even if you have had colonoscopy with polyp removal.  Based on the procedure you had today you may not safely take aspirin or aspirin-like products for the next ten (10) days. Remember that Tylenol (also called acetaminophen) is safe to take after colonoscopy even if you have had biopsies or polyps removed. ACTIVITY:  You may resume your normal household activities, but it is recommended that you spend the remainder of the day resting -  avoid any strenuous activity. CALL DR. Noah Alexis' OFFICE IF:  Increasing pain, nausea, vomiting  Abdominal distension (swelling)  Significant new or increased bleeding (oral or rectal)  Fever/Chills  Chest pain/shortness of breath                       Additional instructions:   No aspirin 10 days. Everything we examined today is completely healthy, great news.    I still cannot explain the iron deficiency unless your diet is very deficient in iron which is uncommon. I did obtain biopsies from the normal appearing small intestine to make sure that it is healthy on a microscopic level and will contact you with those results in 10-14 days. It was an honor to be your doctor today. Please let me or my office staff know if you have any feedback about today's procedure. Alexander Castle MD    Colonoscopy saves lives, and can prevent colon cancer. Everyone aged 48 or older needs colonoscopy.   Tell your family and friends: get the test!

## 2022-02-09 NOTE — PROGRESS NOTES
Meño Teague  2000  [id number]    Situation:  Verbal report received from: [facility] RN   Procedure: Procedure(s):  COLONOSCOPY  ESOPHAGOGASTRODUODENOSCOPY (EGD)  ESOPHAGOGASTRODUODENAL (EGD) BIOPSY    Background:    Preoperative diagnosis: Iron deficiency anemia, unspecified iron deficiency anemia type [D50.9]  Epigastric pain [R10.13]  Postoperative diagnosis: normal upper gi exam.   Normal colon exam.     :  Dr. Serena Hairston  Assistant(s): Endoscopy RN-1: Obed Lopez RN  Endoscopy RN-2: Iveth Hampton RN    Specimens:   ID Type Source Tests Collected by Time Destination   1 : duodenum biopsy Preservative Duodenum  April Figueroa MD 2/9/2022 1053 Pathology     H. Pylori  no    Assessment:    Anesthesia gave intra-procedure sedation and medications, see anesthesia flow sheet no    Intravenous fluids: NS@ KVO     Vital signs stable     Abdominal assessment: round and soft     Recommendation:  Discharge patient per MD order.   Return to floor  Family or Friend   Permission to share finding with family or friend yes

## 2022-02-12 NOTE — ANESTHESIA POSTPROCEDURE EVALUATION
Procedure(s):  COLONOSCOPY  ESOPHAGOGASTRODUODENOSCOPY (EGD)  ESOPHAGOGASTRODUODENAL (EGD) BIOPSY.     MAC    Anesthesia Post Evaluation      Multimodal analgesia: multimodal analgesia used between 6 hours prior to anesthesia start to PACU discharge  Patient location during evaluation: bedside  Patient participation: complete - patient participated  Level of consciousness: awake  Pain management: adequate  Airway patency: patent  Anesthetic complications: no  Cardiovascular status: acceptable  Respiratory status: acceptable  Hydration status: acceptable        INITIAL Post-op Vital signs:   Vitals Value Taken Time   BP     Temp 36.3 °C (97.4 °F) 02/09/22 1115   Pulse     Resp     SpO2

## 2022-03-20 PROBLEM — E66.01 OBESITY, MORBID (HCC): Status: ACTIVE | Noted: 2019-08-09

## 2023-11-28 NOTE — PATIENT INSTRUCTIONS
Patient will check his revisit date with [facility]. [facility] and then will call us and let us know so we can see him following the same. Very proud of his weight reduction in his improvements and he basically is asymptomatic in recent times.
28-Nov-2023 13:27

## 2024-01-14 NOTE — TELEPHONE ENCOUNTER
Patient has a follow up appointment on 8/11/20, but is out of medication at this time. Requesting a refill until 8/11/20. 15

## 2024-11-04 ENCOUNTER — HOSPITAL ENCOUNTER (EMERGENCY)
Facility: HOSPITAL | Age: 24
Discharge: HOME | End: 2024-11-04
Attending: STUDENT IN AN ORGANIZED HEALTH CARE EDUCATION/TRAINING PROGRAM
Payer: COMMERCIAL

## 2024-11-04 VITALS
DIASTOLIC BLOOD PRESSURE: 94 MMHG | HEIGHT: 71 IN | RESPIRATION RATE: 22 BRPM | BODY MASS INDEX: 44.1 KG/M2 | HEART RATE: 100 BPM | SYSTOLIC BLOOD PRESSURE: 154 MMHG | OXYGEN SATURATION: 100 % | WEIGHT: 315 LBS | TEMPERATURE: 97.8 F

## 2024-11-04 DIAGNOSIS — I10 HYPERTENSION, UNSPECIFIED TYPE: Primary | ICD-10-CM

## 2024-11-04 LAB
ANION GAP SERPL CALC-SCNC: 12 MMOL/L (ref 2–12)
BASOPHILS # BLD: 0 K/UL (ref 0–0.1)
BASOPHILS NFR BLD: 0 % (ref 0–1)
BUN SERPL-MCNC: 11 MG/DL (ref 6–20)
BUN/CREAT SERPL: 14 (ref 12–20)
CA-I BLD-MCNC: 9.6 MG/DL (ref 8.5–10.1)
CHLORIDE SERPL-SCNC: 101 MMOL/L (ref 97–108)
CO2 SERPL-SCNC: 28 MMOL/L (ref 21–32)
CREAT SERPL-MCNC: 0.76 MG/DL (ref 0.7–1.3)
DIFFERENTIAL METHOD BLD: ABNORMAL
EOSINOPHIL # BLD: 0.1 K/UL (ref 0–0.4)
EOSINOPHIL NFR BLD: 1 % (ref 0–7)
ERYTHROCYTE [DISTWIDTH] IN BLOOD BY AUTOMATED COUNT: 13.4 % (ref 11.5–14.5)
GLUCOSE SERPL-MCNC: 121 MG/DL (ref 65–100)
HCT VFR BLD AUTO: 45.5 % (ref 36.6–50.3)
HGB BLD-MCNC: 15.2 G/DL (ref 12.1–17)
IMM GRANULOCYTES # BLD AUTO: 0 K/UL (ref 0–0.04)
IMM GRANULOCYTES NFR BLD AUTO: 0 % (ref 0–0.5)
LYMPHOCYTES # BLD: 1.5 K/UL (ref 0.8–3.5)
LYMPHOCYTES NFR BLD: 13 % (ref 12–49)
MCH RBC QN AUTO: 28 PG (ref 26–34)
MCHC RBC AUTO-ENTMCNC: 33.4 G/DL (ref 30–36.5)
MCV RBC AUTO: 83.9 FL (ref 80–99)
MONOCYTES # BLD: 0.8 K/UL (ref 0–1)
MONOCYTES NFR BLD: 7 % (ref 5–13)
NEUTS SEG # BLD: 9.5 K/UL (ref 1.8–8)
NEUTS SEG NFR BLD: 79 % (ref 32–75)
PLATELET # BLD AUTO: 250 K/UL (ref 150–400)
PMV BLD AUTO: 9.8 FL (ref 8.9–12.9)
POTASSIUM SERPL-SCNC: 3.8 MMOL/L (ref 3.5–5.1)
RBC # BLD AUTO: 5.42 M/UL (ref 4.1–5.7)
SODIUM SERPL-SCNC: 141 MMOL/L (ref 136–145)
WBC # BLD AUTO: 11.9 K/UL (ref 4.1–11.1)

## 2024-11-04 PROCEDURE — 99284 EMERGENCY DEPT VISIT MOD MDM: CPT

## 2024-11-04 PROCEDURE — 80048 BASIC METABOLIC PNL TOTAL CA: CPT

## 2024-11-04 PROCEDURE — 6370000000 HC RX 637 (ALT 250 FOR IP): Performed by: STUDENT IN AN ORGANIZED HEALTH CARE EDUCATION/TRAINING PROGRAM

## 2024-11-04 PROCEDURE — 36415 COLL VENOUS BLD VENIPUNCTURE: CPT

## 2024-11-04 PROCEDURE — 85025 COMPLETE CBC W/AUTO DIFF WBC: CPT

## 2024-11-04 PROCEDURE — 93005 ELECTROCARDIOGRAM TRACING: CPT | Performed by: STUDENT IN AN ORGANIZED HEALTH CARE EDUCATION/TRAINING PROGRAM

## 2024-11-04 RX ORDER — AMLODIPINE BESYLATE 5 MG/1
5 TABLET ORAL ONCE
Status: DISCONTINUED | OUTPATIENT
Start: 2024-11-04 | End: 2024-11-05 | Stop reason: HOSPADM

## 2024-11-04 RX ORDER — AMLODIPINE BESYLATE 5 MG/1
5 TABLET ORAL DAILY
Qty: 30 TABLET | Refills: 0 | Status: SHIPPED | OUTPATIENT
Start: 2024-11-04

## 2024-11-04 ASSESSMENT — PAIN - FUNCTIONAL ASSESSMENT: PAIN_FUNCTIONAL_ASSESSMENT: NONE - DENIES PAIN

## 2024-11-05 LAB
EKG ATRIAL RATE: 106 BPM
EKG DIAGNOSIS: NORMAL
EKG P AXIS: 38 DEGREES
EKG P-R INTERVAL: 144 MS
EKG Q-T INTERVAL: 350 MS
EKG QRS DURATION: 100 MS
EKG QTC CALCULATION (BAZETT): 464 MS
EKG R AXIS: 1 DEGREES
EKG T AXIS: 37 DEGREES
EKG VENTRICULAR RATE: 106 BPM

## 2024-11-05 NOTE — ED PROVIDER NOTES
EMERGENCY DEPARTMENT HISTORY AND PHYSICAL EXAM      Date: 11/4/2024  Patient Name: Agustín Delacruz  MRN: [id number]  YOB: 2000  Date of evaluation: 11/4/2024  Provider: Remy Mai MD     History of Present Illness     Chief Complaint   Patient presents with    Hypertension       History Provided By: Patient    HPI: Agustín Delacruz, 24 y.o. male with past medical history as listed and reviewed below presenting to the ED for evaluation of elevated blood pressure.  Patient reports that today he had a cold sensation, or his head and hands, he had a clammy feeling and felt he had a dry mouth.  He checked his blood pressure multiple times and it was elevated.  Every time he checked it it got higher and higher so he came to the emergency department for evaluation.  He denies any chest pain or shortness of breath, denies any headache or vomiting recent fever or chills, no back pain or abdominal pain.  He denies any extremity weakness or numbness or tingling.  Medical History     Past Medical History:  Past Medical History:   Diagnosis Date    Anemia, iron deficiency     GERD (gastroesophageal reflux disease)     Migraines        Past Surgical History:  Past Surgical History:   Procedure Laterality Date    COLONOSCOPY N/A 2/9/2022    COLONOSCOPY performed by Rashi Moreno MD at [facility] ENDOSCOPY    TONSILLECTOMY AND ADENOIDECTOMY      TYMPANOSTOMY TUBE PLACEMENT         Family History:  Family History   Problem Relation Age of Onset    Heart Disease Paternal Grandmother     Heart Disease Paternal Grandfather     Heart Disease Maternal Grandfather     Dementia Maternal Grandfather     Heart Disease Maternal Grandmother     Seizures Mother     Neuropathy Paternal Grandfather     Neuropathy Maternal Grandfather        Social History:  Social History     Tobacco Use    Smoking status: Never    Smokeless tobacco: Never   Substance Use Topics    Alcohol use: Never       Allergies:  No Known Allergies    PCP: [place]

## 2024-11-05 NOTE — DISCHARGE INSTRUCTIONS
Thank you!    Thank you for allowing me to care for you in the emergency department.  I sincerely hope that you are satisfied with your visit today.  It is my goal to provide you with excellent care.    Below you will find a list of your labs and imaging from your visit today if applicable. Should you have any questions regarding these results please do not hesitate to call the emergency department. Please review [employer] for a more detailed result list since the below list may not be comprehensive. Instructions on how to sign up to [employer] should be provided in this packet.    Labs -     Recent Results (from the past 12 hour(s))   CBC with Auto Differential    Collection Time: 11/04/24 10:04 PM   Result Value Ref Range    WBC 11.9 (H) 4.1 - 11.1 K/uL    RBC 5.42 4.10 - 5.70 M/uL    Hemoglobin 15.2 12.1 - 17.0 g/dL    Hematocrit 45.5 36.6 - 50.3 %    MCV 83.9 80.0 - 99.0 FL    MCH 28.0 26.0 - 34.0 PG    MCHC 33.4 30.0 - 36.5 g/dL    RDW 13.4 11.5 - 14.5 %    Platelets 250 150 - 400 K/uL    MPV 9.8 8.9 - 12.9 FL    Neutrophils % 79 (H) 32 - 75 %    Lymphocytes % 13 12 - 49 %    Monocytes % 7 5 - 13 %    Eosinophils % 1 0 - 7 %    Basophils % 0 0 - 1 %    Immature Granulocytes % 0 0.0 - 0.5 %    Neutrophils Absolute 9.5 (H) 1.8 - 8.0 K/UL    Lymphocytes Absolute 1.5 0.8 - 3.5 K/UL    Monocytes Absolute 0.8 0.0 - 1.0 K/UL    Eosinophils Absolute 0.1 0.0 - 0.4 K/UL    Basophils Absolute 0.0 0.0 - 0.1 K/UL    Immature Granulocytes Absolute 0.0 0.00 - 0.04 K/UL    Differential Type AUTOMATED     BMP    Collection Time: 11/04/24 10:04 PM   Result Value Ref Range    Sodium 141 136 - 145 mmol/L    Potassium 3.8 3.5 - 5.1 mmol/L    Chloride 101 97 - 108 mmol/L    CO2 28 21 - 32 mmol/L    Anion Gap 12 2 - 12 mmol/L    Glucose 121 (H) 65 - 100 mg/dL    BUN 11 6 - 20 mg/dL    Creatinine 0.76 0.70 - 1.30 mg/dL    BUN/Creatinine Ratio 14 12 - 20      Est, Glom Filt Rate >90 >60 ml/min/1.73m2    Calcium 9.6 8.5 - 10.1 mg/dL

## 2024-11-05 NOTE — ED TRIAGE NOTES
Pt ambulatory to triage with c/o of hypertension without diagnosis, nausea , dry mouth and feeling clammy.  Pt has not taken BP recently, last saw MD over a year ago.

## 2024-12-12 SDOH — HEALTH STABILITY: PHYSICAL HEALTH: ON AVERAGE, HOW MANY DAYS PER WEEK DO YOU ENGAGE IN MODERATE TO STRENUOUS EXERCISE (LIKE A BRISK WALK)?: 0 DAYS

## 2024-12-12 SDOH — HEALTH STABILITY: PHYSICAL HEALTH: ON AVERAGE, HOW MANY MINUTES DO YOU ENGAGE IN EXERCISE AT THIS LEVEL?: 0 MIN

## 2024-12-13 ENCOUNTER — OFFICE VISIT (OUTPATIENT)
Facility: CLINIC | Age: 24
End: 2024-12-13
Payer: COMMERCIAL

## 2024-12-13 VITALS
DIASTOLIC BLOOD PRESSURE: 84 MMHG | BODY MASS INDEX: 44.1 KG/M2 | OXYGEN SATURATION: 99 % | HEART RATE: 85 BPM | TEMPERATURE: 98.9 F | SYSTOLIC BLOOD PRESSURE: 126 MMHG | HEIGHT: 71 IN | WEIGHT: 315 LBS

## 2024-12-13 DIAGNOSIS — R03.0 ELEVATED BLOOD PRESSURE READING WITHOUT DIAGNOSIS OF HYPERTENSION: Primary | ICD-10-CM

## 2024-12-13 PROCEDURE — 99214 OFFICE O/P EST MOD 30 MIN: CPT | Performed by: NURSE PRACTITIONER

## 2024-12-13 PROCEDURE — G8484 FLU IMMUNIZE NO ADMIN: HCPCS | Performed by: NURSE PRACTITIONER

## 2024-12-13 PROCEDURE — G8419 CALC BMI OUT NRM PARAM NOF/U: HCPCS | Performed by: NURSE PRACTITIONER

## 2024-12-13 PROCEDURE — G8427 DOCREV CUR MEDS BY ELIG CLIN: HCPCS | Performed by: NURSE PRACTITIONER

## 2024-12-13 PROCEDURE — 1036F TOBACCO NON-USER: CPT | Performed by: NURSE PRACTITIONER

## 2024-12-13 NOTE — PROGRESS NOTES
Progress Note        ED follow up - was seen at the Ed where his BP was high and started on amlodipine  Keeping a BP log at home - readings range from 120s-140s and 70s-90s  He's been working on dietary changes since the ED visit and has lost 30 lbs  Strong FH of HTN         Subjective:     Patient's medications, allergies, past medical, surgical, social and family histories were reviewed and updated as appropriate.    Review of Systems   All other systems reviewed and are negative.       Objective:     Vitals:    12/13/24 1441 12/13/24 1513   BP: (!) 157/107 126/84   Pulse: (!) 117 85   Temp: 98.9 °F (37.2 °C)    SpO2: 99%    Weight: (!) 144.4 kg (318 lb 4.8 oz)    Height: 1.803 m (5' 11\")        Physical Exam  Vitals and nursing note reviewed.   Constitutional:       General: He is not in acute distress.     Appearance: Normal appearance. He is obese. He is not ill-appearing.   HENT:      Head: Normocephalic and atraumatic.      Nose: Nose normal.      Mouth/Throat:      Mouth: Mucous membranes are moist.      Pharynx: Oropharynx is clear.   Eyes:      Extraocular Movements: Extraocular movements intact.      Conjunctiva/sclera: Conjunctivae normal.   Cardiovascular:      Rate and Rhythm: Normal rate and regular rhythm.      Pulses: Normal pulses.      Heart sounds: Normal heart sounds.   Pulmonary:      Effort: Pulmonary effort is normal.      Breath sounds: Normal breath sounds.   Musculoskeletal:         General: Normal range of motion.      Cervical back: Normal range of motion and neck supple.      Right lower leg: No edema.      Left lower leg: No edema.   Skin:     General: Skin is warm and dry.   Neurological:      General: No focal deficit present.      Mental Status: He is alert and oriented to person, place, and time.   Psychiatric:         Mood and Affect: Mood normal.         Behavior: Behavior normal.         Assessment/ Plan:     1. Elevated blood pressure reading without diagnosis of

## 2024-12-13 NOTE — PROGRESS NOTES
Agustín Delacruz is a 24 y.o. male , id x 2(name and ). Reviewed record, history, and  medications.    This patient is accompanied in the office by his mother Prema.I have received verbal consent from Agustín Delacruz to discuss any/all medical information while they are present in the room.    Chief Complaint   Patient presents with    New Patient    ED Follow-up     - HBP, chills, lightheaded- went to [facility]   Also reports stomach pains and frozen/ clammy hands      Has been keeping track of his BP since his ED visit   Was given a BP med to take PRN by the ED but has not needed to take a dose yet.     Vitals:    24 1441   BP: (!) 157/107   Pulse: (!) 117   Temp: 98.9 °F (37.2 °C)   SpO2: 99%   Weight: (!) 144.4 kg (318 lb 4.8 oz)   Height: 1.803 m (5' 11\")     Fasting    \"Have you been to the ER, urgent care clinic since your last visit?  Hospitalized since your last visit?\"    YES - see visit reason    “Have you seen or consulted any other health care providers outside of [facility] since your last visit?”    NO        2022    10:24 AM   Amb Fall Risk Assessment and TUG Test   Total Score 0         2021     3:50 PM   PHQ-9    Little interest or pleasure in doing things 0   Total Score PHQ 2 0          No data to display              Social Determinants of Health     Tobacco Use: Low Risk  (2024)    Patient History     Smoking Tobacco Use: Never     Smokeless Tobacco Use: Never     Passive Exposure: Not on file   Alcohol Use: Not At Risk (2019)    Received from [facility]  (prior to 2023)    AUDIT-C     Frequency of Alcohol Consumption: Never     Average Number of Drinks: Not on file     Frequency of Binge Drinking: Not on file   Financial Resource Strain: Not on file   Food Insecurity: Not on file   Transportation Needs: Not on file   Physical Activity: Inactive (2024)    Exercise Vital Sign     Days of Exercise per Week: 0 days

## (undated) DEVICE — SOLIDIFIER MEDC 1200ML -- CONVERT TO 356117

## (undated) DEVICE — BAG BELONG PT PERS CLEAR HANDL

## (undated) DEVICE — CANN NASAL O2 CAPNOGRAPHY AD -- FILTERLINE

## (undated) DEVICE — 1200 GUARD II KIT W/5MM TUBE W/O VAC TUBE: Brand: GUARDIAN

## (undated) DEVICE — CATH IV AUTOGRD BC PNK 20GA 25 -- INSYTE

## (undated) DEVICE — 3M™ CUROS™ DISINFECTING CAP FOR NEEDLELESS CONNECTORS 270/CARTON 20 CARTONS/CASE CFF1-270: Brand: CUROS™

## (undated) DEVICE — (D)SENSOR RMFG 02 PULS OXMTR -- DISC BY MFR USE ITEM 133445

## (undated) DEVICE — KIT COLON W/ 1.1OZ LUB AND 2 END

## (undated) DEVICE — SIMPLICITY FLUFF UNDERPAD 23X36, MODERATE: Brand: SIMPLICITY

## (undated) DEVICE — BAG SPEC BIOHZRD 10 X 10 IN --

## (undated) DEVICE — FORCEPS BX L240CM JAW DIA2.8MM L CAP W/ NDL MIC MESH TOOTH

## (undated) DEVICE — SET GRAV CK VLV NEEDLESS ST 3 GANGED 4WAY STPCOCK HI FLO 10

## (undated) DEVICE — CONTAINER SPEC 20 ML LID NEUT BUFF FORMALIN 10 % POLYPR STS

## (undated) DEVICE — Device

## (undated) DEVICE — ELECTRODE,RADIOTRANSLUCENT,FOAM,3PK: Brand: MEDLINE

## (undated) DEVICE — CUFF RMFG BP INF SZ 11 DISP -- LAWSON OEM ITEM 238915

## (undated) DEVICE — BITEBLOCK ENDOSCP 60FR MAXI WHT POLYETH STURDY W/ VELC WVN